# Patient Record
Sex: MALE | Race: WHITE | NOT HISPANIC OR LATINO | Employment: FULL TIME | RURAL
[De-identification: names, ages, dates, MRNs, and addresses within clinical notes are randomized per-mention and may not be internally consistent; named-entity substitution may affect disease eponyms.]

---

## 2017-10-07 ENCOUNTER — OFFICE VISIT (OUTPATIENT)
Dept: RETAIL CLINIC | Facility: CLINIC | Age: 62
End: 2017-10-07

## 2017-10-07 VITALS
BODY MASS INDEX: 30.52 KG/M2 | HEIGHT: 70 IN | HEART RATE: 71 BPM | RESPIRATION RATE: 20 BRPM | TEMPERATURE: 97.4 F | WEIGHT: 213.2 LBS | OXYGEN SATURATION: 99 %

## 2017-10-07 DIAGNOSIS — H65.02 ACUTE SEROUS OTITIS MEDIA OF LEFT EAR, RECURRENCE NOT SPECIFIED: Primary | ICD-10-CM

## 2017-10-07 PROCEDURE — 99203 OFFICE O/P NEW LOW 30 MIN: CPT | Performed by: NURSE PRACTITIONER

## 2017-10-07 RX ORDER — AMOXICILLIN 875 MG/1
875 TABLET, COATED ORAL 2 TIMES DAILY
Qty: 20 TABLET | Refills: 0 | Status: SHIPPED | OUTPATIENT
Start: 2017-10-07 | End: 2017-10-17

## 2017-10-07 RX ORDER — CHLORAL HYDRATE 500 MG
CAPSULE ORAL
COMMUNITY

## 2017-10-07 RX ORDER — PREDNISONE 20 MG/1
20 TABLET ORAL DAILY
Qty: 6 TABLET | Refills: 0 | Status: SHIPPED | OUTPATIENT
Start: 2017-10-07 | End: 2017-10-13

## 2017-10-07 NOTE — PATIENT INSTRUCTIONS

## 2017-10-07 NOTE — PROGRESS NOTES
"Subjective   Marvel Johnson is a 62 y.o. male.   Chief Complaint   Patient presents with   • Earache      Earache    There is pain in the left ear. This is a new problem. The current episode started 1 to 4 weeks ago (1-2 weeks). The problem has been waxing and waning. There has been no fever. Associated symptoms include ear discharge (clear, wax colored). Pertinent negatives include no coughing, headaches, hearing loss, rash, rhinorrhea or sore throat. Treatments tried: Excedrin. The treatment provided mild relief.          Marvel presents to [facility] with cc of an earache x 2 weeks and says he has taken nothing Excedrin for the earache on occasion and that has helped him, he denies fever or chilling.  Reviewed PMFSH.  See ROS.        The following portions of the patient's history were reviewed and updated as appropriate: allergies, current medications, past family history, past medical history, past social history, past surgical history and problem list.    Review of Systems   HENT: Positive for ear discharge (clear, wax colored) and ear pain (left). Negative for hearing loss, rhinorrhea and sore throat.    Respiratory: Negative for cough, shortness of breath and wheezing.    Endocrine: Negative for cold intolerance.   Skin: Negative for pallor and rash.   Neurological: Negative for dizziness and headaches.     Pulse 71  Temp 97.4 °F (36.3 °C) (Temporal Artery )   Resp 20  Ht 70\" (177.8 cm)  Wt 213 lb 3.2 oz (96.7 kg)  SpO2 99%  BMI 30.59 kg/m2    Objective     Current Outpatient Prescriptions:   •  LISINOPRIL PO, Take  by mouth., Disp: , Rfl:   •  Omega-3 Fatty Acids (FISH OIL) 1000 MG capsule capsule, Take  by mouth Daily With Breakfast., Disp: , Rfl:   •  amoxicillin (AMOXIL) 875 MG tablet, Take 1 tablet by mouth 2 (Two) Times a Day for 10 days., Disp: 20 tablet, Rfl: 0  •  predniSONE (DELTASONE) 20 MG tablet, Take 1 tablet by mouth Daily for 6 days., Disp: 6 tablet, Rfl: 0  No Known Allergies    Physical Exam "   Constitutional: He is oriented to person, place, and time. He appears well-developed and well-nourished. No distress.   HENT:   Head: Normocephalic and atraumatic.   Right Ear: Tympanic membrane and external ear normal.   Left Ear: There is tenderness. No drainage or swelling. Tympanic membrane is retracted (dull). Tympanic membrane mobility is abnormal.   Nose: Nose normal.   Mouth/Throat: Uvula is midline, oropharynx is clear and moist and mucous membranes are normal. No oropharyngeal exudate. Tonsils are 1+ on the right. Tonsils are 1+ on the left. No tonsillar exudate.   Eyes: EOM are normal. Pupils are equal, round, and reactive to light.   Neck: Normal range of motion. Neck supple.   Cardiovascular: Normal rate, regular rhythm and normal heart sounds.    Pulmonary/Chest: Effort normal and breath sounds normal.   Abdominal: Soft. Bowel sounds are normal.   Musculoskeletal: Normal range of motion.   Lymphadenopathy:     He has no cervical adenopathy.   Neurological: He is alert and oriented to person, place, and time.   Skin: Skin is warm and dry.   Psychiatric: He has a normal mood and affect. His behavior is normal. Judgment and thought content normal.   Nursing note and vitals reviewed.      Assessment/Plan   Marvel was seen today for earache.    Diagnoses and all orders for this visit:    Acute serous otitis media of left ear, recurrence not specified  -     amoxicillin (AMOXIL) 875 MG tablet; Take 1 tablet by mouth 2 (Two) Times a Day for 10 days.  -     predniSONE (DELTASONE) 20 MG tablet; Take 1 tablet by mouth Daily for 6 days.